# Patient Record
Sex: FEMALE | Race: WHITE | NOT HISPANIC OR LATINO | Employment: FULL TIME | URBAN - METROPOLITAN AREA
[De-identification: names, ages, dates, MRNs, and addresses within clinical notes are randomized per-mention and may not be internally consistent; named-entity substitution may affect disease eponyms.]

---

## 2017-02-08 ENCOUNTER — TELEPHONE (OUTPATIENT)
Dept: FAMILY MEDICINE | Facility: CLINIC | Age: 32
End: 2017-02-08

## 2017-02-08 NOTE — TELEPHONE ENCOUNTER
2/8/2017    Call Regarding Preventive Health Screening Cervical/PAP    Attempt 1    Message on voicemail     Comments:       Outreach   cnt

## 2017-03-23 ENCOUNTER — TELEPHONE (OUTPATIENT)
Dept: FAMILY MEDICINE | Facility: CLINIC | Age: 32
End: 2017-03-23

## 2017-03-23 NOTE — LETTER
[facility]  [address] [place]  [address]  Phone: [phone]      March 23, 2017      Lee Ann Eluthier  [address] [facility] [address] [address]              Dear Ms. Valdez,    In order to ensure we are providing the best quality care, Dr. Seaman and I have reviewed your chart and see that you are due for a yearly Physical including a Pap, fasting cholesterol lab test and to update Depression/Anxiety questionnaires.     Please call the clinic to set up an office visit at [phone] or [phone]. Please come fasting to the appointment, usually early morning appointment make this easier. Fasting = Nothing to eat or drink for 10-12 hours prior to test, but can have plenty of water.     We greatly appreciate the opportunity to serve you. Thank you for trusting us with your health care.     Sincerely,     DEIRDRE San M.D.

## 2017-03-23 NOTE — TELEPHONE ENCOUNTER
Panel Management Review      Patient has the following on her problem list:     Anxiety  review  PHQ-9 SCORE 5/5/2008 4/18/2016   Total Score 2 -   Total Score - 11     ROSY-7 SCORE 4/18/2016   Total Score 10      Patient is due for:  PHQ9 and ROSY      Composite cancer screening  Chart review shows that this patient is due/due soon for the following Pap Smear  Summary:    Patient is due/failing the following:   LDL, PAP and PHQ9    Action needed:   Patient needs office visit for PEPAP, fasting labs. and Patient needs to do PHQ9.    Type of outreach:    Sent [facility] message. and Sent letter.    Questions for provider review:    None                                                                                                                                    Daria Yates CMA

## 2017-04-05 NOTE — TELEPHONE ENCOUNTER
"Letter returned stating \"Moved Left No Address -  Unable to Forward - Return to Sender\".  ..Franca Claros    "

## 2017-05-17 NOTE — TELEPHONE ENCOUNTER
5/17/2017     Call Regarding Preventive Health Screening Cervical/PAP, LDL and Physical  Attempt 2     Message on voicemail   Comments:         Outreach   BENITA

## 2017-05-24 NOTE — TELEPHONE ENCOUNTER
5/24/2017    Call Regarding Preventive Health Screening Cervical/PAP and LDL    Attempt 3    Message on voicemail     Comments:       Outreach   CC

## 2017-05-27 ENCOUNTER — HEALTH MAINTENANCE LETTER (OUTPATIENT)
Age: 32
End: 2017-05-27

## 2020-11-03 ENCOUNTER — TRANSFERRED RECORDS (OUTPATIENT)
Dept: HEALTH INFORMATION MANAGEMENT | Facility: CLINIC | Age: 35
End: 2020-11-03

## 2020-11-03 LAB — PAP-ABSTRACT: NORMAL

## 2020-12-11 ENCOUNTER — TRANSFERRED RECORDS (OUTPATIENT)
Dept: HEALTH INFORMATION MANAGEMENT | Facility: CLINIC | Age: 35
End: 2020-12-11

## 2021-01-19 ENCOUNTER — TRANSFERRED RECORDS (OUTPATIENT)
Dept: HEALTH INFORMATION MANAGEMENT | Facility: CLINIC | Age: 36
End: 2021-01-19

## 2021-03-23 ENCOUNTER — OFFICE VISIT (OUTPATIENT)
Dept: FAMILY MEDICINE | Facility: CLINIC | Age: 36
End: 2021-03-23
Payer: COMMERCIAL

## 2021-03-23 VITALS
TEMPERATURE: 99.2 F | OXYGEN SATURATION: 100 % | HEIGHT: 65 IN | DIASTOLIC BLOOD PRESSURE: 47 MMHG | WEIGHT: 138 LBS | BODY MASS INDEX: 22.99 KG/M2 | SYSTOLIC BLOOD PRESSURE: 119 MMHG | HEART RATE: 96 BPM

## 2021-03-23 DIAGNOSIS — Z23 NEED FOR TETANUS BOOSTER: Primary | ICD-10-CM

## 2021-03-23 DIAGNOSIS — F41.1 GENERALIZED ANXIETY DISORDER: ICD-10-CM

## 2021-03-23 DIAGNOSIS — Z30.09 GENERAL COUNSELING FOR PRESCRIPTION OF ORAL CONTRACEPTIVES: ICD-10-CM

## 2021-03-23 PROCEDURE — 90471 IMMUNIZATION ADMIN: CPT | Performed by: FAMILY MEDICINE

## 2021-03-23 PROCEDURE — 96127 BRIEF EMOTIONAL/BEHAV ASSMT: CPT | Performed by: FAMILY MEDICINE

## 2021-03-23 PROCEDURE — 90714 TD VACC NO PRESV 7 YRS+ IM: CPT | Performed by: FAMILY MEDICINE

## 2021-03-23 PROCEDURE — 99204 OFFICE O/P NEW MOD 45 MIN: CPT | Mod: 25 | Performed by: FAMILY MEDICINE

## 2021-03-23 RX ORDER — DESOGESTREL AND ETHINYL ESTRADIOL 0.15-0.03
KIT ORAL
Qty: 108 TABLET | Refills: 3 | Status: SHIPPED | OUTPATIENT
Start: 2021-03-23 | End: 2022-03-07

## 2021-03-23 RX ORDER — FLUOXETINE 10 MG/1
10 CAPSULE ORAL DAILY
Qty: 14 CAPSULE | Refills: 0 | Status: SHIPPED | OUTPATIENT
Start: 2021-03-23 | End: 2021-05-13

## 2021-03-23 RX ORDER — HYDROXYZINE HYDROCHLORIDE 50 MG/1
50 TABLET, FILM COATED ORAL EVERY 6 HOURS PRN
Qty: 120 TABLET | Refills: 3 | Status: SHIPPED | OUTPATIENT
Start: 2021-03-23 | End: 2021-10-22

## 2021-03-23 RX ORDER — HYDROXYZINE HYDROCHLORIDE 50 MG/1
50 TABLET, FILM COATED ORAL PRN
COMMUNITY
End: 2021-05-13

## 2021-03-23 ASSESSMENT — MIFFLIN-ST. JEOR: SCORE: 1312.87
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
## 2021-03-24 ASSESSMENT — ANXIETY QUESTIONNAIRES
IF YOU CHECKED OFF ANY PROBLEMS ON THIS QUESTIONNAIRE, HOW DIFFICULT HAVE THESE PROBLEMS MADE IT FOR YOU TO DO YOUR WORK, TAKE CARE OF THINGS AT HOME, OR GET ALONG WITH OTHER PEOPLE: VERY DIFFICULT
GAD7 TOTAL SCORE: 18
1. FEELING NERVOUS, ANXIOUS, OR ON EDGE: NEARLY EVERY DAY
3. WORRYING TOO MUCH ABOUT DIFFERENT THINGS: NEARLY EVERY DAY
5. BEING SO RESTLESS THAT IT IS HARD TO SIT STILL: MORE THAN HALF THE DAYS
2. NOT BEING ABLE TO STOP OR CONTROL WORRYING: NEARLY EVERY DAY
7. FEELING AFRAID AS IF SOMETHING AWFUL MIGHT HAPPEN: NEARLY EVERY DAY
6. BECOMING EASILY ANNOYED OR IRRITABLE: NEARLY EVERY DAY

## 2021-03-24 ASSESSMENT — PATIENT HEALTH QUESTIONNAIRE - PHQ9
SUM OF ALL RESPONSES TO PHQ QUESTIONS 1-9: 13
5. POOR APPETITE OR OVEREATING: SEVERAL DAYS

## 2021-03-25 ASSESSMENT — ANXIETY QUESTIONNAIRES: GAD7 TOTAL SCORE: 18

## 2021-05-13 ENCOUNTER — OFFICE VISIT (OUTPATIENT)
Dept: FAMILY MEDICINE | Facility: CLINIC | Age: 36
End: 2021-05-13
Payer: COMMERCIAL

## 2021-05-13 VITALS
TEMPERATURE: 98.9 F | DIASTOLIC BLOOD PRESSURE: 86 MMHG | BODY MASS INDEX: 22.99 KG/M2 | HEART RATE: 90 BPM | SYSTOLIC BLOOD PRESSURE: 126 MMHG | OXYGEN SATURATION: 100 % | HEIGHT: 65 IN | WEIGHT: 138 LBS

## 2021-05-13 DIAGNOSIS — L98.9 SKIN LESION: ICD-10-CM

## 2021-05-13 DIAGNOSIS — F41.1 GENERALIZED ANXIETY DISORDER: ICD-10-CM

## 2021-05-13 DIAGNOSIS — Z12.4 SCREENING FOR CERVICAL CANCER: ICD-10-CM

## 2021-05-13 DIAGNOSIS — Z13.220 SCREENING FOR CHOLESTEROL LEVEL: ICD-10-CM

## 2021-05-13 DIAGNOSIS — Z00.00 ROUTINE GENERAL MEDICAL EXAMINATION AT A HEALTH CARE FACILITY: Primary | ICD-10-CM

## 2021-05-13 PROCEDURE — 87624 HPV HI-RISK TYP POOLED RSLT: CPT | Performed by: FAMILY MEDICINE

## 2021-05-13 PROCEDURE — 99395 PREV VISIT EST AGE 18-39: CPT | Performed by: FAMILY MEDICINE

## 2021-05-13 PROCEDURE — 99213 OFFICE O/P EST LOW 20 MIN: CPT | Mod: 25 | Performed by: FAMILY MEDICINE

## 2021-05-13 PROCEDURE — G0145 SCR C/V CYTO,THINLAYER,RESCR: HCPCS | Performed by: FAMILY MEDICINE

## 2021-05-13 PROCEDURE — 96127 BRIEF EMOTIONAL/BEHAV ASSMT: CPT | Performed by: FAMILY MEDICINE

## 2021-05-13 ASSESSMENT — ANXIETY QUESTIONNAIRES
5. BEING SO RESTLESS THAT IT IS HARD TO SIT STILL: NOT AT ALL
1. FEELING NERVOUS, ANXIOUS, OR ON EDGE: NOT AT ALL
3. WORRYING TOO MUCH ABOUT DIFFERENT THINGS: NOT AT ALL
2. NOT BEING ABLE TO STOP OR CONTROL WORRYING: NOT AT ALL
7. FEELING AFRAID AS IF SOMETHING AWFUL MIGHT HAPPEN: NOT AT ALL
GAD7 TOTAL SCORE: 0
6. BECOMING EASILY ANNOYED OR IRRITABLE: NOT AT ALL

## 2021-05-13 ASSESSMENT — PATIENT HEALTH QUESTIONNAIRE - PHQ9
5. POOR APPETITE OR OVEREATING: NOT AT ALL
SUM OF ALL RESPONSES TO PHQ QUESTIONS 1-9: 2

## 2021-05-13 ASSESSMENT — MIFFLIN-ST. JEOR: SCORE: 1308.9

## 2021-05-13 NOTE — PROGRESS NOTES
128/86    SUBJECTIVE:   CC: Lee Ann Valdez is an 35 year old woman who presents for preventive health visit.     Patient has been advised of split billing requirements and indicates understanding: Yes     Healthy Habits:    Do you get at least three servings of calcium containing foods daily (dairy, green leafy vegetables, etc.)? yes    Amount of exercise or daily activities, outside of work: 4-5 day(s) per week    Problems taking medications regularly No    Medication side effects: No    Have you had an eye exam in the past two years? yes    Do you see a dentist twice per year? yes    Do you have sleep apnea, excessive snoring or daytime drowsiness?no      Anxiety Follow-Up    How are you doing with your anxiety since your last visit? Improved     Are you having other symptoms that might be associated with anxiety? No    Have you had a significant life event? No     Are you feeling depressed? Yes:    but better     Do you have any concerns with your use of alcohol or other drugs? No    Social History     Tobacco Use     Smoking status: Never Smoker     Smokeless tobacco: Never Used   Substance Use Topics     Alcohol use: Yes     Comment: Couple per week.      Drug use: No     ROSY-7 SCORE 4/18/2016 3/24/2021   Total Score 10 18     PHQ 4/18/2016 3/24/2021 5/13/2021   PHQ-9 Total Score 11 13 2   Q9: Thoughts of better off dead/self-harm past 2 weeks Not at all Not at all Not at all     Last PHQ-9 5/13/2021   1.  Little interest or pleasure in doing things 0   2.  Feeling down, depressed, or hopeless 0   3.  Trouble falling or staying asleep, or sleeping too much 1   4.  Feeling tired or having little energy 1   5.  Poor appetite or overeating 0   6.  Feeling bad about yourself 0   7.  Trouble concentrating 0   8.  Moving slowly or restless 0   Q9: Thoughts of better off dead/self-harm past 2 weeks 0   PHQ-9 Total Score 2   Difficulty at work, home, or with people Not difficult at all     ROSY-7  5/13/2021   1.  Feeling nervous, anxious, or on edge 0   2. Not being able to stop or control worrying 0   3. Worrying too much about different things 0   4. Trouble relaxing 0   5. Being so restless that it is hard to sit still 0   6. Becoming easily annoyed or irritable 0   7. Feeling afraid, as if something awful might happen 0   ROSY-7 Total Score 0   If you checked any problems, how difficult have they made it for you to do your work, take care of things at home, or get along with other people? -         Abuse: Current or Past(Physical, Sexual or Emotional)- No  Do you feel safe in your environment? Yes    Have you ever done Advance Care Planning? (For example, a Health Directive, POLST, or a discussion with a medical provider or your loved ones about your wishes): No, advance care planning information given to patient to review.  Patient declined advance care planning discussion at this time.    Social History     Tobacco Use     Smoking status: Never Smoker     Smokeless tobacco: Never Used   Substance Use Topics     Alcohol use: Yes     Comment: Couple per week.      If you drink alcohol do you typically have >3 drinks per day or >7 drinks per week? No                     Reviewed orders with patient.  Reviewed health maintenance and updated orders accordingly - Yes  BP Readings from Last 3 Encounters:   05/13/21 126/86   03/23/21 119/47   04/18/16 120/66    Wt Readings from Last 3 Encounters:   05/13/21 62.6 kg (138 lb)   03/23/21 62.6 kg (138 lb)   04/18/16 57.2 kg (126 lb)                  Patient Active Problem List   Diagnosis     CARDIOVASCULAR SCREENING; LDL GOAL LESS THAN 160     Iron deficiency anemia     Vitamin D deficiency     [facility]     Past Surgical History:   Procedure Laterality Date     NO HISTORY OF SURGERY         Social History     Tobacco Use     Smoking status: Never Smoker     Smokeless tobacco: Never Used   Substance Use Topics     Alcohol use: Yes     Comment: Couple per week.       "Family History   Problem Relation Age of Onset     Depression Maternal Uncle      Diabetes Maternal Uncle      Asthma No family hx of      C.A.D. No family hx of      Cerebrovascular Disease No family hx of      Breast Cancer No family hx of      Cancer - colorectal No family hx of      Prostate Cancer No family hx of      Hypertension Mother            FSH-7: No flowsheet data found.        Pertinent mammograms are reviewed under the imaging tab.  History of abnormal Pap smear: NO - age 30-65 PAP every 5 years with negative HPV co-testing recommended  PAP / HPV 12/17/2012 5/20/2011 5/5/2008   PAP NIL NIL NIL     Reviewed and updated as needed this visit by clinical staff  Tobacco  Allergies  Meds   Med Hx  Surg Hx  Fam Hx  Soc Hx        Reviewed and updated as needed this visit by Provider                Past Medical History:   Diagnosis Date     NO ACTIVE PROBLEMS         ROS:  CONSTITUTIONAL: NEGATIVE for fever, chills, change in weight  INTEGUMENTARU/SKIN: NEGATIVE for worrisome rashes, moles or lesions  EYES: NEGATIVE for vision changes or irritation  ENT: NEGATIVE for ear, mouth and throat problems  RESP: NEGATIVE for significant cough or SOB  BREAST: NEGATIVE for masses, tenderness or discharge  CV: NEGATIVE for chest pain, palpitations or peripheral edema  GI: NEGATIVE for nausea, abdominal pain, heartburn, or change in bowel habits  : NEGATIVE for unusual urinary or vaginal symptoms. Periods are regular.  MUSCULOSKELETAL: NEGATIVE for significant arthralgias or myalgia  NEURO: NEGATIVE for weakness, dizziness or paresthesias  PSYCHIATRIC: NEGATIVE for changes in mood or affect    OBJECTIVE:   /86 (BP Location: Right arm, Patient Position: Sitting, Cuff Size: Adult Regular)   Pulse 90   Temp 98.9  F (37.2  C) (Tympanic)   Ht 1.638 m (5' 4.5\")   Wt 62.6 kg (138 lb)   SpO2 100%   BMI 23.32 kg/m    EXAM:  GENERAL: healthy, alert and no distress  EYES: Eyes grossly normal to inspection, PERRL " and conjunctivae and sclerae normal  HENT: ear canals and TM's normal, nose and mouth without ulcers or lesions  NECK: no adenopathy, no asymmetry, masses, or scars and thyroid normal to palpation  RESP: lungs clear to auscultation - no rales, rhonchi or wheezes  BREAST: normal without masses, tenderness or nipple discharge and no palpable axillary masses or adenopathy  CV: regular rate and rhythm, normal S1 S2, no S3 or S4, no murmur, click or rub, no peripheral edema and peripheral pulses strong  ABDOMEN: soft, nontender, no hepatosplenomegaly, no masses and bowel sounds normal   (female): normal female external genitalia, normal urethral meatus, vaginal mucosa pink, moist, well rugated, and normal cervix/adnexa/uterus without masses or discharge  MS: no gross musculoskeletal defects noted, no edema  SKIN: raised lesion 1 cm left inner thigh,elongated mole dark left lower abdomen    NEURO: Normal strength and tone, mentation intact and speech normal  PSYCH: mentation appears normal, affect normal/bright    Diagnostic Test Results:  Labs reviewed in Epic    ASSESSMENT/PLAN:   1. Routine general medical examination at a health care facility      2. Screening for cervical cancer  History of  leep   - HPV High Risk Types DNA Cervical  - Pap imaged thin layer screen with HPV - recommended age 30 - 65    3. Screening for cholesterol level    - Lipid panel reflex to direct LDL Fasting not fasting     4. Generalized anxiety disorder  Much improved   - FLUoxetine (PROZAC) 20 MG capsule; Take 1 capsule (20 mg) by mouth daily  Dispense: 90 capsule; Refill: 1    5. Skin lesion  See derm   - DERMATOLOGY ADULT REFERRAL; Future    Patient has been advised of split billing requirements and indicates understanding: Yes  COUNSELING:   Reviewed preventive health counseling, as reflected in patient instructions    Estimated body mass index is 23.32 kg/m  as calculated from the following:    Height as of this encounter: 1.638 m (5'  "4.5\").    Weight as of this encounter: 62.6 kg (138 lb).        She reports that she has never smoked. She has never used smokeless tobacco.      Counseling Resources:  ATP IV Guidelines  Pooled Cohorts Equation Calculator  Breast Cancer Risk Calculator  BRCA-Related Cancer Risk Assessment: FHS-7 Tool  FRAX Risk Assessment  ICSI Preventive Guidelines  Dietary Guidelines for Americans, 2010  USDA's MyPlate  ASA Prophylaxis  Lung CA Screening    Delfina Seaman MD  [facility]  "

## 2021-05-14 ASSESSMENT — ANXIETY QUESTIONNAIRES: GAD7 TOTAL SCORE: 0

## 2021-05-17 LAB
COPATH REPORT: NORMAL
PAP: NORMAL

## 2021-05-19 LAB
FINAL DIAGNOSIS: NORMAL
HPV HR 12 DNA CVX QL NAA+PROBE: NEGATIVE
HPV16 DNA SPEC QL NAA+PROBE: NEGATIVE
HPV18 DNA SPEC QL NAA+PROBE: NEGATIVE
SPECIMEN DESCRIPTION: NORMAL
SPECIMEN SOURCE CVX/VAG CYTO: NORMAL

## 2021-06-16 ENCOUNTER — OFFICE VISIT (OUTPATIENT)
Dept: DERMATOLOGY | Facility: CLINIC | Age: 36
End: 2021-06-16
Attending: FAMILY MEDICINE
Payer: COMMERCIAL

## 2021-06-16 VITALS — DIASTOLIC BLOOD PRESSURE: 60 MMHG | HEART RATE: 80 BPM | SYSTOLIC BLOOD PRESSURE: 116 MMHG | OXYGEN SATURATION: 100 %

## 2021-06-16 DIAGNOSIS — D48.5 NEOPLASM OF UNCERTAIN BEHAVIOR OF SKIN: Primary | ICD-10-CM

## 2021-06-16 DIAGNOSIS — D23.9 DERMATOFIBROMA: ICD-10-CM

## 2021-06-16 PROCEDURE — 11102 TANGNTL BX SKIN SINGLE LES: CPT | Performed by: PHYSICIAN ASSISTANT

## 2021-06-16 PROCEDURE — 88305 TISSUE EXAM BY PATHOLOGIST: CPT | Performed by: DERMATOLOGY

## 2021-06-16 PROCEDURE — 99203 OFFICE O/P NEW LOW 30 MIN: CPT | Mod: 25 | Performed by: PHYSICIAN ASSISTANT

## 2021-06-16 NOTE — LETTER
6/16/2021         RE: Lee Ann Valdez  [address] [facility] [address]        Dear Colleague,    Thank you for referring your patient, Lee Ann Valdez, to the [facility]. Please see a copy of my visit note below.    Lee Ann Valdez is an extremely pleasant 35 year old year old female patient here today for mole on left abdomen and bump on thigh. She notes mole has been present for years, becoming more raised. Rubbing on clothing. She also notes bump on left thigh, present for years, gets irritated shaving.  Patient has no other skin complaints today.  Remainder of the HPI, Meds, PMH, Allergies, FH, and SH was reviewed in chart.    Past Medical History:   Diagnosis Date     NO ACTIVE PROBLEMS        Past Surgical History:   Procedure Laterality Date     NO HISTORY OF SURGERY          Family History   Problem Relation Age of Onset     Hypertension Mother      Depression Maternal Uncle      Diabetes Maternal Uncle      Asthma No family hx of      C.A.D. No family hx of      Cerebrovascular Disease No family hx of      Breast Cancer No family hx of      Cancer - colorectal No family hx of      Prostate Cancer No family hx of        Social History     Socioeconomic History     Marital status: Single     Spouse name: Augie Valdez     Number of children: 0     Years of education: 16+     Highest education level: Not on file   Occupational History     Occupation: IT     Employer: [employer]   Social Needs     Financial resource strain: Not on file     Food insecurity     Worry: Not on file     Inability: Not on file     Transportation needs     Medical: Not on file     Non-medical: Not on file   Tobacco Use     Smoking status: Never Smoker     Smokeless tobacco: Never Used   Substance and Sexual Activity     Alcohol use: Yes     Comment: Couple per week.      Drug use: No     Sexual activity: Yes     Partners: Male     Birth control/protection: Condom, Pill   Lifestyle      Physical activity     Days per week: Not on file     Minutes per session: Not on file     Stress: Not on file   Relationships     Social connections     Talks on phone: Not on file     Gets together: Not on file     Attends Religion service: Not on file     Active member of club or organization: Not on file     Attends meetings of clubs or organizations: Not on file     Relationship status: Not on file     Intimate partner violence     Fear of current or ex partner: Not on file     Emotionally abused: Not on file     Physically abused: Not on file     Forced sexual activity: Not on file   Other Topics Concern      Service No     Blood Transfusions No     Caffeine Concern No     Occupational Exposure No     Hobby Hazards No     Sleep Concern No     Stress Concern No     Weight Concern No     Special Diet No     Back Care No     Exercise No     Bike Helmet Yes     Seat Belt Yes     Self-Exams Yes     Parent/sibling w/ CABG, MI or angioplasty before 65F 55M? No   Social History Narrative    Lives with 2 roommates       Outpatient Encounter Medications as of 6/16/2021   Medication Sig Dispense Refill     desogestrel-ethinyl estradiol (APRI) 0.15-30 MG-MCG tablet Take 1 active pill daily for 12 weeks skip the placebo pills except for the 13th week. 108 tablet 3     FLUoxetine (PROZAC) 20 MG capsule Take 1 capsule (20 mg) by mouth daily 90 capsule 1     hydrOXYzine (ATARAX) 50 MG tablet Take 1 tablet (50 mg) by mouth every 6 hours as needed for anxiety 120 tablet 3     Pediatric Multivit-Minerals-C (FLINTSTONES COMPLETE PO) Take  by mouth. 1 tablet daily         No facility-administered encounter medications on file as of 6/16/2021.              O:   NAD, WDWN, Alert & Oriented, Mood & Affect wnl, Vitals stable   Here today alone   /60   Pulse 80   SpO2 100%    General appearance normal   Vitals stable   Alert, oriented and in no acute distress     Pink firm papule on left thigh   0.5 cm light brown  papule on left lateral abdomen       Eyes: Conjunctivae/lids:Normal     ENT: Lips: normal    MSK:Normal    Pulm: Breathing Normal    Neuro/Psych: Orientation:Alert and Orientedx3 ; Mood/Affect:normal     A/P:  1. Dermatofibroma on left thigh   Discussed excision since bothersome, she will schedule with Dr. Sharma for excision.     2. R/O irritated nevus on left lateral abdomen  TANGENTIAL BIOPSY SENT OUT:  After consent, anesthesia with LEC and prep, tangential excision performed and specimen sent out for permanent section histology.  No complications and routine wound care. Patient told to call our office in 1-2 weeks for result.              Again, thank you for allowing me to participate in the care of your patient.        Sincerely,        vYette Augustine PA-C

## 2021-06-16 NOTE — PROGRESS NOTES
Lee Ann Valdez is an extremely pleasant 35 year old year old female patient here today for mole on left abdomen and bump on thigh. She notes mole has been present for years, becoming more raised. Rubbing on clothing. She also notes bump on left thigh, present for years, gets irritated shaving.  Patient has no other skin complaints today.  Remainder of the HPI, Meds, PMH, Allergies, FH, and SH was reviewed in chart.    Past Medical History:   Diagnosis Date     NO ACTIVE PROBLEMS        Past Surgical History:   Procedure Laterality Date     NO HISTORY OF SURGERY          Family History   Problem Relation Age of Onset     Hypertension Mother      Depression Maternal Uncle      Diabetes Maternal Uncle      Asthma No family hx of      C.A.D. No family hx of      Cerebrovascular Disease No family hx of      Breast Cancer No family hx of      Cancer - colorectal No family hx of      Prostate Cancer No family hx of        Social History     Socioeconomic History     Marital status: Single     Spouse name: Augie Valdez     Number of children: 0     Years of education: 16+     Highest education level: Not on file   Occupational History     Occupation: IT     Employer: [employer]   Social Needs     Financial resource strain: Not on file     Food insecurity     Worry: Not on file     Inability: Not on file     Transportation needs     Medical: Not on file     Non-medical: Not on file   Tobacco Use     Smoking status: Never Smoker     Smokeless tobacco: Never Used   Substance and Sexual Activity     Alcohol use: Yes     Comment: Couple per week.      Drug use: No     Sexual activity: Yes     Partners: Male     Birth control/protection: Condom, Pill   Lifestyle     Physical activity     Days per week: Not on file     Minutes per session: Not on file     Stress: Not on file   Relationships     Social connections     Talks on phone: Not on file     Gets together: Not on file     Attends Zoroastrian service: Not on  file     Active member of club or organization: Not on file     Attends meetings of clubs or organizations: Not on file     Relationship status: Not on file     Intimate partner violence     Fear of current or ex partner: Not on file     Emotionally abused: Not on file     Physically abused: Not on file     Forced sexual activity: Not on file   Other Topics Concern      Service No     Blood Transfusions No     Caffeine Concern No     Occupational Exposure No     Hobby Hazards No     Sleep Concern No     Stress Concern No     Weight Concern No     Special Diet No     Back Care No     Exercise No     Bike Helmet Yes     Seat Belt Yes     Self-Exams Yes     Parent/sibling w/ CABG, MI or angioplasty before 65F 55M? No   Social History Narrative    Lives with 2 roommates       Outpatient Encounter Medications as of 6/16/2021   Medication Sig Dispense Refill     desogestrel-ethinyl estradiol (APRI) 0.15-30 MG-MCG tablet Take 1 active pill daily for 12 weeks skip the placebo pills except for the 13th week. 108 tablet 3     FLUoxetine (PROZAC) 20 MG capsule Take 1 capsule (20 mg) by mouth daily 90 capsule 1     hydrOXYzine (ATARAX) 50 MG tablet Take 1 tablet (50 mg) by mouth every 6 hours as needed for anxiety 120 tablet 3     Pediatric Multivit-Minerals-C (FLINTSTONES COMPLETE PO) Take  by mouth. 1 tablet daily         No facility-administered encounter medications on file as of 6/16/2021.              O:   NAD, WDWN, Alert & Oriented, Mood & Affect wnl, Vitals stable   Here today alone   /60   Pulse 80   SpO2 100%    General appearance normal   Vitals stable   Alert, oriented and in no acute distress     Pink firm papule on left thigh   0.5 cm light brown papule on left lateral abdomen       Eyes: Conjunctivae/lids:Normal     ENT: Lips: normal    MSK:Normal    Pulm: Breathing Normal    Neuro/Psych: Orientation:Alert and Orientedx3 ; Mood/Affect:normal     A/P:  1. Dermatofibroma on left thigh   Discussed  excision since bothersome, she will schedule with Dr. Sharma for excision.     2. R/O irritated nevus on left lateral abdomen  TANGENTIAL BIOPSY SENT OUT:  After consent, anesthesia with LEC and prep, tangential excision performed and specimen sent out for permanent section histology.  No complications and routine wound care. Patient told to call our office in 1-2 weeks for result.

## 2021-06-16 NOTE — PATIENT INSTRUCTIONS
Wound Care Instructions     FOR SUPERFICIAL WOUNDS     [facility] [phone]    [facility] [phone]                       AFTER 24 HOURS YOU SHOULD REMOVE THE BANDAGE AND BEGIN DAILY DRESSING CHANGES AS FOLLOWS:     1) Remove Dressing.     2) Clean and dry the area with tap water using a Q-tip or sterile gauze pad.     3) Apply Vaseline, Aquaphor, Polysporin ointment or Bacitracin ointment over entire wound.  Do NOT use Neosporin ointment.     4) Cover the wound with a band-aid, or a sterile non-stick gauze pad and micropore paper tape      REPEAT THESE INSTRUCTIONS AT LEAST ONCE A DAY UNTIL THE WOUND HAS COMPLETELY HEALED.    It is an old wives tale that a wound heals better when it is exposed to air and allowed to dry out. The wound will heal faster with a better cosmetic result if it is kept moist with ointment and covered with a bandage.    **Do not let the wound dry out.**      Supplies Needed:      *Cotton tipped applicators (Q-tips)    *Polysporin Ointment or Bacitracin Ointment (NOT NEOSPORIN)    *Band-aids or non-stick gauze pads and micropore paper tape.      PATIENT INFORMATION:    During the healing process you will notice a number of changes. All wounds develop a small halo of redness surrounding the wound.  This means healing is occurring. Severe itching with extensive redness usually indicates sensitivity to the ointment or bandage tape used to dress the wound.  You should call our office if this develops.      Swelling  and/or discoloration around your surgical site is common, particularly when performed around the eye.    All wounds normally drain.  The larger the wound the more drainage there will be.  After 7-10 days, you will notice the wound beginning to shrink and new skin will begin to grow.  The wound is healed when you can see skin has formed over the entire area.  A healed wound has a healthy, shiny look to the surface and is red to dark pink in color  to normalize.  Wounds may take approximately 4-6 weeks to heal.  Larger wounds may take 6-8 weeks.  After the wound is healed you may discontinue dressing changes.    You may experience a sensation of tightness as your wound heals. This is normal and will gradually subside.    Your healed wound may be sensitive to temperature changes. This sensitivity improves with time, but if you re having a lot of discomfort, try to avoid temperature extremes.    Patients frequently experience itching after their wound appears to have healed because of the continue healing under the skin.  Plain Vaseline will help relieve the itching.        POSSIBLE COMPLICATIONS    BLEEDIN. Leave the bandage in place.  2. Use tightly rolled up gauze or a cloth to apply direct pressure over the bandage for 30  minutes.  3. Reapply pressure for an additional 30 minutes if necessary  4. Use additional gauze and tape to maintain pressure once the bleeding has stopped.

## 2021-06-18 LAB — COPATH REPORT: NORMAL

## 2021-09-13 ENCOUNTER — MYC MEDICAL ADVICE (OUTPATIENT)
Dept: FAMILY MEDICINE | Facility: CLINIC | Age: 36
End: 2021-09-13

## 2021-10-07 ENCOUNTER — TELEPHONE (OUTPATIENT)
Dept: DERMATOLOGY | Facility: CLINIC | Age: 36
End: 2021-10-07

## 2021-10-07 NOTE — TELEPHONE ENCOUNTER
[facility] Call Center    Phone Message    May a detailed message be left on voicemail: yes     Reason for Call: Other: Pt had a Dermatoprome excision procedure scheduled with Dr. Sharma on 10/13 in [facility], Pt has recently moved to [place] and is wanting this procedure done at the [facility] instead. Please call Pt at [phone] to further discuss scheduling options. Thank you.     Action Taken: Message routed to:  [facility] ([facility]): Derm Surg    Travel Screening: Not Applicable

## 2021-10-22 DIAGNOSIS — F41.1 GENERALIZED ANXIETY DISORDER: ICD-10-CM

## 2021-10-22 RX ORDER — HYDROXYZINE HYDROCHLORIDE 50 MG/1
TABLET, FILM COATED ORAL
Qty: 120 TABLET | Refills: 1 | Status: SHIPPED | OUTPATIENT
Start: 2021-10-22 | End: 2023-04-25

## 2021-10-25 ENCOUNTER — TRANSFERRED RECORDS (OUTPATIENT)
Dept: HEALTH INFORMATION MANAGEMENT | Facility: CLINIC | Age: 36
End: 2021-10-25
Payer: COMMERCIAL

## 2021-11-15 ENCOUNTER — TRANSFERRED RECORDS (OUTPATIENT)
Dept: HEALTH INFORMATION MANAGEMENT | Facility: CLINIC | Age: 36
End: 2021-11-15
Payer: COMMERCIAL

## 2021-11-23 ENCOUNTER — TRANSFERRED RECORDS (OUTPATIENT)
Dept: HEALTH INFORMATION MANAGEMENT | Facility: CLINIC | Age: 36
End: 2021-11-23
Payer: COMMERCIAL

## 2021-12-17 DIAGNOSIS — F41.1 GENERALIZED ANXIETY DISORDER: ICD-10-CM

## 2022-03-05 DIAGNOSIS — Z30.09 GENERAL COUNSELING FOR PRESCRIPTION OF ORAL CONTRACEPTIVES: ICD-10-CM

## 2022-03-07 RX ORDER — DESOGESTREL AND ETHINYL ESTRADIOL 0.15-0.03
KIT ORAL
Qty: 28 TABLET | Refills: 2 | Status: SHIPPED | OUTPATIENT
Start: 2022-03-07 | End: 2022-05-06

## 2022-03-07 NOTE — TELEPHONE ENCOUNTER
Prescription approved per [facility] Refill Protocol.  Michelle Martinez RN

## 2022-05-06 DIAGNOSIS — Z30.09 GENERAL COUNSELING FOR PRESCRIPTION OF ORAL CONTRACEPTIVES: ICD-10-CM

## 2022-05-06 RX ORDER — DESOGESTREL AND ETHINYL ESTRADIOL 0.15-0.03
KIT ORAL
Qty: 28 TABLET | Refills: 0 | Status: SHIPPED | OUTPATIENT
Start: 2022-05-06 | End: 2022-05-19

## 2022-05-06 NOTE — TELEPHONE ENCOUNTER
October 12, 2020       Libertad Cedillo MD  [address]  [address] [address]  Via Fax: [phone]      Patient: Hilary Washington   YOB: 1956   Date of Visit: 10/12/2020       Dear Dr. Cedillo:    Thank you for referring Hilary Washington to me for evaluation. Below are my notes for this visit with her.    If you have questions, please do not hesitate to call me. I look forward to following your patient along with you.      Sincerely,        Karina Cordoba DO        CC: No Recipients  Karina Cordoba DO  10/12/2020  3:14 PM  Signed  Cardiac Follow-Up    Referring  Physician   Libertad Cedillo MD  [address] [address] [phone]     HPI   The patient is a 64 year old female who presented for follow-up appointment     is pleasant 64-year-old female with past medical history of hypertension, hyperlipidemia, breast cancer status post lumpectomy and radiation in 2015, tongue cancer status post resection in 1990, chronic kidney disease with creatinine around 1.7 who presented for follow-up.      Patient was in her usual state of health until August 10. She wears fitbit and noticed elevation with heart rate at rest and with minimal physical activity.   During the evaluation at her primary care physician office EKG was consistent with new atrial fibrillation. She was started on Eliquis 5 mg p.o. twice daily and referred for cardiology consultation.      Patient walks for 30 minutes twice a week. She denies significant caffeine or alcohol intake.   She did not require any chemotherapy for treatment of her tongue cancer and right breast cancer. She sees Dr. Johnson on the regular basis.      She was not able to tolerate rate-controlled a-fib and was started on Flecainide 50 mg po bid. She noticed significant improvement in symptoms after initiation of antiarrhythmic medication.      Today, she denies any pressure-type chest pain, dizziness, weakness, syncopal episodes.   She  Prescription approved per [facility] Refill Protocol.  Scheduled 5/19/22.  Michelle Martinez RN      feels very well in general.   No new complaints  LDL - 108 on atorvastatin 10 mg po daily  LE edema - ON AND OFF  + Arthralgia  She has intermittent \" noise in the ear \"    Past Medical History:   Past Medical History:   Diagnosis Date   • Chronic kidney disease    • Essential hypertension, benign    • GERD (gastroesophageal reflux disease)    • History of atrial fibrillation    • History of tongue cancer    • Hyperlipidemia    • Malignant neoplasm of breast (CMS/HCC)      Past Surgical History:   Past Surgical History:   Procedure Laterality Date   • Breast lumpectomy     • Cholecystectomy     • Tonsillectomy       Family History:   Family History   Problem Relation Age of Onset   • Diabetes Mother    • Atrial Fibrilliation Brother      Social History:   Social History     Tobacco Use   • Smoking status: Never Smoker   • Smokeless tobacco: Never Used   Substance Use Topics   • Alcohol use: Yes     Comment: Social    • Drug use: Not on file     Allergies:   ALLERGIES:   Allergen Reactions   • Ciprofloxacin Other (See Comments)     Unknown   • Levaquin Other (See Comments)     Unknown   • Sulfa Antibiotics Other (See Comments)     Unknown     Medications Including OTC:  Current Outpatient Medications   Medication Sig Dispense Refill   • metoPROLOL succinate (TOPROL-XL) 50 MG 24 hr tablet Take 1 tablet by mouth daily. 90 tablet 3   • atorvastatin (LIPITOR) 20 MG tablet Take 1 tablet by mouth daily. 90 tablet 1   • flecainide (TAMBOCOR) 50 MG tablet TAKE 1 TABLET TWICE A  tablet 3   • ELIQUIS 5 MG Tab TAKE 1 TABLET TWICE A  tablet 3   • famotidine (PEPCID) 20 MG tablet TAKE 1 TABLET BY MOUTH EVERY 12 HOURS 60 tablet 0   • EXEMESTANE PO Take 25 mg by mouth.     • cholecalciferol (VITAMIN D3) 1000 UNITS tablet Take 1,000 Units by mouth 2 times daily.     • amLODIPine (NORVASC) 10 MG tablet TAKE 1 TABLET BY MOUTH  DAILY       No current facility-administered medications for this visit.        ROS  Review of  Systems   Constitutional: Negative.    HENT: Negative.    Eyes: Negative.    Respiratory: Negative.    Cardiovascular: Negative for chest pain and leg swelling.   Gastrointestinal: Negative.    Endocrine: Negative.    Genitourinary: Negative.    Musculoskeletal: Negative.    Skin: Negative.    Allergic/Immunologic: Negative.    Neurological: Negative.    Hematological: Negative.    Psychiatric/Behavioral: Negative.    All other systems reviewed and are negative.      Physical Examination  Visit Vitals  /70 (BP Location: LUE - Left upper extremity, Patient Position: Standing, Cuff Size: Regular)   Pulse 72   Temp 97.6 °F (36.4 °C)   Resp 18   Ht 5' 6\" (1.676 m)   Wt 101.2 kg (223 lb)   SpO2 97%   BMI 35.99 kg/m²       Physical Exam   Constitutional: She is oriented to person, place, and time. She appears well-nourished. She is cooperative.   HENT:   Head: Normocephalic and atraumatic.   Mouth/Throat: Oropharynx is clear and moist and mucous membranes are normal.   Eyes: Pupils are equal, round, and reactive to light.   Neck: Trachea normal and normal range of motion. Neck supple. No JVD present. No muscular tenderness present. Carotid bruit is not present. No edema and no erythema present.   Cardiovascular: Regular rhythm, S1 normal, S2 normal and intact distal pulses. Bradycardia present. PMI is not displaced. Exam reveals no S3, no S4, no friction rub and no decreased pulses.   No murmur heard.  Trace b/l pitting edema - non-pitting   Pulmonary/Chest: Breath sounds normal. No respiratory distress. She has no decreased breath sounds. She has no wheezes.   Abdominal: Soft. Normal aorta. She exhibits no distension and no abdominal bruit. There is no abdominal tenderness. There is no CVA tenderness.   Musculoskeletal: Normal range of motion.   Neurological: She is alert and oriented to person, place, and time. She has normal strength. No sensory deficit.   Skin: Skin is warm and intact. No cyanosis. No pallor.  Nails show no clubbing.   Psychiatric: She has a normal mood and affect. Her speech is normal and behavior is normal.       All previous records reviewed    Laboratory Data  Prior Original Records on 11/11/2019   Component Date Value Ref Range Status   • Sodium 11/11/2019 142  135 - 145 mmol/L Final   • Creatinine Clearance (Est) 11/11/2019 58.0800  mL/min Final   • BUN/Creatinine Ratio 11/11/2019 28* 7 - 25 Final   • GFR Estimate,  11/11/2019 43  mL/min Final   • GFR Estimate, Non  11/11/2019 37  mL/min Final   • A/G Ratio, Serum 11/11/2019 1.0  1.0 - 2.4 Final   • FASTING STATUS 11/11/2019 2  hrs Final   • Potassium 11/11/2019 5.4* 3.4 - 5.1 mmol/L Final   • Chloride 11/11/2019 113* 98 - 107 mmol/L Final   • Carbon Dioxide 11/11/2019 21  21 - 32 mmol/L Final   • Anion Gap 11/11/2019 13  10 - 20 mmol/L Final   • Glucose 11/11/2019 93  65 - 99 mg/dL Final   • BUN 11/11/2019 41* 6 - 20 mg/dL Final   • Creatinine 11/11/2019 1.48* 0.51 - 0.95 mg/dL Final   • CALCIUM 11/11/2019 9.2  8.4 - 10.2 mg/dL Final   • TOTAL BILIRUBIN 11/11/2019 0.5  0.2 - 1.0 mg/dL Final   • AST/SGOT 11/11/2019 29  <38 Units/L Final   • ALT/SGPT 11/11/2019 26  <64 Units/L Final   • ALK PHOSPHATASE 11/11/2019 93  45 - 117 Units/L Final   • TOTAL PROTEIN 11/11/2019 6.9  6.4 - 8.2 g/dL Final   • Albumin 11/11/2019 3.5* 3.6 - 5.1 g/dL Final   • GLOBULIN 11/11/2019 3.4  2.0 - 4.0 g/dL Final   Prior Original Records on 11/11/2019   Component Date Value Ref Range Status   • CA27.29 11/11/2019 17.4  0.0 - 40.0 Units/mL Final   Prior Original Records on 11/11/2019   Component Date Value Ref Range Status   • WBC 11/11/2019 9.0  4.2 - 11.0 K/mcL Final   • RBC 11/11/2019 4.17  4.00 - 5.20 mil/mcL Final   • HGB 11/11/2019 12.9  12.0 - 15.5 g/dL Final   • HCT 11/11/2019 40.5  36.0 - 46.5 % Final   • MCV 11/11/2019 97.1  78.0 - 100.0 fl Final   • MCH 11/11/2019 30.9  26.0 - 34.0 pg Final   • MCHC 11/11/2019 31.9* 32.0 - 36.5  g/dL Final   • RDW-CV 11/11/2019 13.6  11.0 - 15.0 % Final   • PLT 11/11/2019 360  140 - 450 K/mcL Final   • NUCLEATED RBC'S 11/11/2019 0  0 - 0 /100 WBC Final   • DIFF TYPE 11/11/2019 AUTOMATED DIFFERENTIAL   Final   • Neutrophil 11/11/2019 70  % Final   • Lymph 11/11/2019 17  % Final   • MONO 11/11/2019 9  % Final   • EOSIN 11/11/2019 3  0.0000 - 6.0000 % Final   • BASO 11/11/2019 1  0.0000 - 2.0000 % Final   • Percent Immature Granuloctyes 11/11/2019 0  % Final   • Absolute Neutrophil 11/11/2019 6.3  1.8 - 7.7 K/mcL Final   • Absolute Lymph 11/11/2019 1.6  1.0 - 4.0 K/mcL Final   • Absolute Mono 11/11/2019 0.8  0.3 - 0.9 K/mcL Final   • Absolute Eos 11/11/2019 0.3  0.1 - 0.5 K/mcL Final   • Absolute Baso 11/11/2019 0.1  0.0 - 0.3 K/mcL Final   • Absolute Immature Granulocytes 11/11/2019 0.0  0 - 0.2 K/mcl Final   Prior Original Records on 11/11/2019   Component Date Value Ref Range Status   • CEA 11/11/2019 1.1  0.0 - 5.0 ng/mL Final   Prior Original Records on 05/06/2019   Component Date Value Ref Range Status   • WBC 05/06/2019 7.0  4.2 - 11.0 K/mcL Final   • RBC 05/06/2019 4.12  4.00 - 5.20 mil/mcL Final   • HGB 05/06/2019 13.0  12.0 - 15.5 g/dL Final   • HCT 05/06/2019 40.0  36.0 - 46.5 % Final   • MCV 05/06/2019 97.1  78.0 - 100.0 fl Final   • MCH 05/06/2019 31.6  26.0 - 34.0 pg Final   • MCHC 05/06/2019 32.5  32.0 - 36.5 g/dL Final   • RDW-CV 05/06/2019 13.4  11.0 - 15.0 % Final   • PLT 05/06/2019 293  140 - 450 K/mcL Final   • NUCLEATED RBC'S 05/06/2019 0  0 - 0 /100 WBC Final   • DIFF TYPE 05/06/2019 AUTOMATED DIFFERENTIAL   Final   • Neutrophil 05/06/2019 64  % Final   • Lymph 05/06/2019 20  % Final   • MONO 05/06/2019 10  % Final   • EOSIN 05/06/2019 5  0.0000 - 6.0000 % Final   • BASO 05/06/2019 1  0.0000 - 2.0000 % Final   • Percent Immature Granuloctyes 05/06/2019 0  % Final   • Absolute Neutrophil 05/06/2019 4.4  1.8 - 7.7 K/mcL Final   • Absolute Lymph 05/06/2019 1.4  1.0 - 4.0 K/mcL Final   •  Absolute Mono 05/06/2019 0.7  0.3 - 0.9 K/mcL Final   • Absolute Eos 05/06/2019 0.4  0.1 - 0.5 K/mcL Final   • Absolute Baso 05/06/2019 0.1  0.0 - 0.3 K/mcL Final   • Absolute Immature Granulocytes 05/06/2019 0.0  0 - 0.2 K/mcl Final   Prior Original Records on 05/06/2019   Component Date Value Ref Range Status   • CA27.29 05/06/2019 14.3  0.0 - 40.0 Units/mL Final   Prior Original Records on 05/06/2019   Component Date Value Ref Range Status   • CEA 05/06/2019 1.4  0.0 - 5.0 ng/mL Final   Prior Original Records on 05/06/2019   Component Date Value Ref Range Status   • Sodium 05/06/2019 142  135 - 145 mmol/L Final   • Creatinine Clearance (Est) 05/06/2019 57.0800  mL/min Final   • BUN/Creatinine Ratio 05/06/2019 22  7 - 25 Final   • GFR Estimate,  05/06/2019 39  mL/min Final   • GFR Estimate, Non  05/06/2019 34  mL/min Final   • A/G Ratio, Serum 05/06/2019 1.2  1.0 - 2.4 Final   • Potassium 05/06/2019 4.8  3.4 - 5.1 mmol/L Final   • Chloride 05/06/2019 111* 98 - 107 mmol/L Final   • Carbon Dioxide 05/06/2019 24  21 - 32 mmol/L Final   • Anion Gap 05/06/2019 12  10 - 20 mmol/L Final   • Glucose 05/06/2019 98  65 - 99 mg/dL Final   • BUN 05/06/2019 36* 6 - 20 mg/dL Final   • Creatinine 05/06/2019 1.61* 0.51 - 0.95 mg/dL Final   • CALCIUM 05/06/2019 9.2  8.4 - 10.2 mg/dL Final   • TOTAL BILIRUBIN 05/06/2019 0.5  0.2 - 1.0 mg/dL Final   • AST/SGOT 05/06/2019 23  <38 Units/L Final   • ALT/SGPT 05/06/2019 21  <64 Units/L Final   • ALK PHOSPHATASE 05/06/2019 94  45 - 117 Units/L Final   • TOTAL PROTEIN 05/06/2019 6.5  6.4 - 8.2 g/dL Final   • Albumin 05/06/2019 3.6  3.6 - 5.1 g/dL Final   • GLOBULIN 05/06/2019 2.9  2.0 - 4.0 g/dL Final   Prior Original Records on 11/06/2018   Component Date Value Ref Range Status   • CEA 11/06/2018 0.5  0.0 - 5.0 ng/mL Final   Prior Original Records on 11/06/2018   Component Date Value Ref Range Status   • CA27.29 11/06/2018 15.4  0.0 - 40.0 Units/mL Final    There may be more visits with results that are not included.       EKG   Results for orders placed or performed in visit on 04/22/19   ELECTROCARDIOGRAM 12-LEAD    Impression    Sinus bradycardia, QRS - 100ms     EKG - NSR, QRS interval 102ms    Assessment/Plan  Problem List Items Addressed This Visit        Circulatory    Paroxysmal atrial fibrillation (CMS/HCC)    Relevant Orders    ELECTROCARDIOGRAM 12-LEAD    Benign essential hypertension - Primary    Relevant Orders    ELECTROCARDIOGRAM 12-LEAD    Sinus bradycardia    Relevant Orders    ELECTROCARDIOGRAM 12-LEAD       Musculoskeletal    Bilateral leg edema    Relevant Orders    ELECTROCARDIOGRAM 12-LEAD       Endocrine    Hyperlipidemia    Relevant Orders    ELECTROCARDIOGRAM 12-LEAD       is pleasant 63-year-old female with past medical history of hypertension, hyperlipidemia, right breast cancer status post lumpectomy and radiation in 2015, tongue cancer status post resection in 1990, chronic kidney disease with creatinine around 1.7 who presented for follow-up      Paroxysmal atrial fibrillation - now in NSR  [id number] score is at least 2, which correlates with 2.2% risk for stroke per year without anticoagulation.   Continue Eliquis 5 mg p.o. twice daily  Continue metoprolol succinate 50 mg p.o. daily  Continue Flecainide 50mg po bid, QRS is 100ms at the last visit     Hypertension -well controlled on current regimen  Change amlodipine to 5 mg po bid instead of 10 mg po daily to minimize LE edema    Hyperlipidemia - atorvastatin 20 mg p.o. daily      Shortness of breath - 2/2 a-fib with rvr - resolved  BNP - 53  Nuclear medicine stress test on 09/12/18 - negative for ischemia     LE edema b/l - non-pitting 2/2 amlodipine -on and off  Continue to monitor  Encourage low sodium diet !     Sinus bradycardia - medications induced, asymptomatic , occasional   Continue current dose of medications unless HR persistently < 50    CKD - creatinine is  stable    Return in about 6 months (around 4/12/2021).                     Karina Cordoba D.O.

## 2022-05-18 ASSESSMENT — ENCOUNTER SYMPTOMS
NERVOUS/ANXIOUS: 1
BREAST MASS: 0

## 2022-05-19 ENCOUNTER — OFFICE VISIT (OUTPATIENT)
Dept: FAMILY MEDICINE | Facility: CLINIC | Age: 37
End: 2022-05-19
Payer: COMMERCIAL

## 2022-05-19 VITALS
SYSTOLIC BLOOD PRESSURE: 122 MMHG | DIASTOLIC BLOOD PRESSURE: 80 MMHG | WEIGHT: 148 LBS | HEART RATE: 83 BPM | TEMPERATURE: 98.8 F | BODY MASS INDEX: 25.27 KG/M2 | OXYGEN SATURATION: 100 % | HEIGHT: 64 IN

## 2022-05-19 DIAGNOSIS — Z12.4 SCREENING FOR CERVICAL CANCER: ICD-10-CM

## 2022-05-19 DIAGNOSIS — Z30.09 GENERAL COUNSELING FOR PRESCRIPTION OF ORAL CONTRACEPTIVES: ICD-10-CM

## 2022-05-19 DIAGNOSIS — Z98.890 HISTORY OF LOOP ELECTRICAL EXCISION PROCEDURE (LEEP): ICD-10-CM

## 2022-05-19 DIAGNOSIS — Z13.220 SCREENING FOR CHOLESTEROL LEVEL: ICD-10-CM

## 2022-05-19 DIAGNOSIS — Z11.59 ENCOUNTER FOR HEPATITIS C SCREENING TEST FOR LOW RISK PATIENT: ICD-10-CM

## 2022-05-19 DIAGNOSIS — Z00.00 ROUTINE GENERAL MEDICAL EXAMINATION AT A HEALTH CARE FACILITY: Primary | ICD-10-CM

## 2022-05-19 LAB
CHOLEST SERPL-MCNC: 191 MG/DL
FASTING STATUS PATIENT QL REPORTED: NO
HCV AB SERPL QL IA: NONREACTIVE
HDLC SERPL-MCNC: 91 MG/DL
LDLC SERPL CALC-MCNC: 79 MG/DL
NONHDLC SERPL-MCNC: 100 MG/DL
TRIGL SERPL-MCNC: 104 MG/DL

## 2022-05-19 PROCEDURE — 99395 PREV VISIT EST AGE 18-39: CPT | Performed by: FAMILY MEDICINE

## 2022-05-19 PROCEDURE — 87624 HPV HI-RISK TYP POOLED RSLT: CPT | Performed by: FAMILY MEDICINE

## 2022-05-19 PROCEDURE — 36415 COLL VENOUS BLD VENIPUNCTURE: CPT | Performed by: FAMILY MEDICINE

## 2022-05-19 PROCEDURE — 80061 LIPID PANEL: CPT | Performed by: FAMILY MEDICINE

## 2022-05-19 PROCEDURE — 88175 CYTOPATH C/V AUTO FLUID REDO: CPT | Performed by: FAMILY MEDICINE

## 2022-05-19 PROCEDURE — 86803 HEPATITIS C AB TEST: CPT | Performed by: FAMILY MEDICINE

## 2022-05-19 RX ORDER — DESOGESTREL AND ETHINYL ESTRADIOL 0.15-0.03
KIT ORAL
Qty: 84 TABLET | Refills: 3 | Status: SHIPPED | OUTPATIENT
Start: 2022-05-19 | End: 2023-09-12

## 2022-05-19 ASSESSMENT — ANXIETY QUESTIONNAIRES
GAD7 TOTAL SCORE: 3
2. NOT BEING ABLE TO STOP OR CONTROL WORRYING: NOT AT ALL
7. FEELING AFRAID AS IF SOMETHING AWFUL MIGHT HAPPEN: NOT AT ALL
GAD7 TOTAL SCORE: 3
3. WORRYING TOO MUCH ABOUT DIFFERENT THINGS: SEVERAL DAYS
1. FEELING NERVOUS, ANXIOUS, OR ON EDGE: SEVERAL DAYS
6. BECOMING EASILY ANNOYED OR IRRITABLE: SEVERAL DAYS
5. BEING SO RESTLESS THAT IT IS HARD TO SIT STILL: NOT AT ALL

## 2022-05-19 ASSESSMENT — ENCOUNTER SYMPTOMS
BREAST MASS: 0
NERVOUS/ANXIOUS: 1

## 2022-05-19 ASSESSMENT — PATIENT HEALTH QUESTIONNAIRE - PHQ9
5. POOR APPETITE OR OVEREATING: NOT AT ALL
SUM OF ALL RESPONSES TO PHQ QUESTIONS 1-9: 2
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
## 2022-05-23 NOTE — RESULT ENCOUNTER NOTE
Lee Ann,  Your lab results were normal/stable. Please feel free to my chart or call the office with questions. Delfina Seaman M.D.

## 2022-05-24 LAB
BKR LAB AP GYN ADEQUACY: NORMAL
BKR LAB AP GYN INTERPRETATION: NORMAL
BKR LAB AP HPV REFLEX: NORMAL
BKR LAB AP PREVIOUS ABNL DX: NORMAL
BKR LAB AP PREVIOUS ABNORMAL: NORMAL
PATH REPORT.COMMENTS IMP SPEC: NORMAL
PATH REPORT.COMMENTS IMP SPEC: NORMAL
PATH REPORT.RELEVANT HX SPEC: NORMAL

## 2022-05-26 ENCOUNTER — PATIENT OUTREACH (OUTPATIENT)
Dept: FAMILY MEDICINE | Facility: CLINIC | Age: 37
End: 2022-05-26
Payer: COMMERCIAL

## 2022-05-26 LAB
HUMAN PAPILLOMA VIRUS 16 DNA: NEGATIVE
HUMAN PAPILLOMA VIRUS 18 DNA: NEGATIVE
HUMAN PAPILLOMA VIRUS FINAL DIAGNOSIS: NORMAL
HUMAN PAPILLOMA VIRUS OTHER HR: NEGATIVE

## 2023-01-25 ENCOUNTER — TRANSFERRED RECORDS (OUTPATIENT)
Dept: HEALTH INFORMATION MANAGEMENT | Facility: CLINIC | Age: 38
End: 2023-01-25

## 2023-04-19 ENCOUNTER — OFFICE VISIT (OUTPATIENT)
Dept: FAMILY MEDICINE | Facility: CLINIC | Age: 38
End: 2023-04-19
Payer: COMMERCIAL

## 2023-04-19 VITALS
WEIGHT: 149 LBS | BODY MASS INDEX: 25.44 KG/M2 | HEART RATE: 99 BPM | HEIGHT: 64 IN | SYSTOLIC BLOOD PRESSURE: 122 MMHG | DIASTOLIC BLOOD PRESSURE: 78 MMHG | OXYGEN SATURATION: 100 % | TEMPERATURE: 99.3 F

## 2023-04-19 DIAGNOSIS — R07.89 CHEST PRESSURE: Primary | ICD-10-CM

## 2023-04-19 DIAGNOSIS — K21.00 GASTROESOPHAGEAL REFLUX DISEASE WITH ESOPHAGITIS WITHOUT HEMORRHAGE: ICD-10-CM

## 2023-04-19 DIAGNOSIS — F41.1 GENERALIZED ANXIETY DISORDER: ICD-10-CM

## 2023-04-19 PROCEDURE — 99214 OFFICE O/P EST MOD 30 MIN: CPT | Performed by: FAMILY MEDICINE

## 2023-04-19 RX ORDER — TRAZODONE HYDROCHLORIDE 150 MG/1
TABLET ORAL PRN
COMMUNITY

## 2023-04-19 RX ORDER — HYDROXYZINE PAMOATE 50 MG/1
50 CAPSULE ORAL 3 TIMES DAILY PRN
Qty: 90 CAPSULE | Refills: 3 | Status: SHIPPED | OUTPATIENT
Start: 2023-04-19

## 2023-04-19 RX ORDER — MULTIVIT,CALC,MINS/IRON/FOLIC 500-18-0.4
TABLET ORAL
COMMUNITY

## 2023-04-19 NOTE — PROGRESS NOTES
"  Assessment & Plan     Chest pressure  Unlikely to be cardiac she does have the phlegm in the morning and is clearing her throat we will treat for  - omeprazole (PRILOSEC) 20 MG DR capsule; Take 1 capsule (20 mg) by mouth 2 times daily    Gastroesophageal reflux disease with esophagitis without hemorrhage  GERD 2 times daily for 4 weeks then decrease to once daily  - omeprazole (PRILOSEC) 20 MG DR capsule; Take 1 capsule (20 mg) by mouth 2 times daily    Generalized anxiety disorder  This actually does help her the 50 mg helps calm her down.  - hydrOXYzine (VISTARIL) 50 MG capsule; Take 1 capsule (50 mg) by mouth 3 times daily as needed for anxiety or other (sleep)         BMI:   Estimated body mass index is 25.58 kg/m  as calculated from the following:    Height as of this encounter: 1.626 m (5' 4\").    Weight as of this encounter: 67.6 kg (149 lb).       Patient Instructions   Take the omeprazole 2 times per day for 2 weeks then decrease to at bedtime for 4 weeks then try going off of this . If this recurs go back on it       Delfina Seaman MD  [facility] KINZA Nance is a 37 year old, presenting for the following health issues:  Chest Pain    Urgent Care November in [place], ekg was normal.   Given hydroxyzine (25mg, used 50mg in the past) & Propranolol - didn't help.   Seen a primary in January in [place]. Chest xray & blood work- normal.   Was to follow up but had to return to [place] for her family.  Filling out release for records.   She also had pap during the PCP visit.   Daria Yates CMA    History of Present Illness       Reason for visit:  Chest pain  Symptom onset:  More than a month  Had these symptoms before:  Yes    She eats 2-3 servings of fruits and vegetables daily.She consumes 1 sweetened beverage(s) daily.She exercises with enough effort to increase her heart rate 20 to 29 minutes per day.  She exercises with enough effort to increase her heart rate 4 days per week.   She " "is taking medications regularly.       Went to urgent care as above told her it was anxiety and gave her atarax   Then follow up with primary care did bloodwork chest xray was given propranolol  She continues to have this very light pressure.  It is in the center of the chest.  She is concerned that it something cardiac although she does admit that it most likely would be related to anxiety and increased stress with work etc. there is no syncope or presyncope she has not had elevated blood pressures when she has been able to check them.  She does wake up in the morning with phlegm in the back of her throat she does notice some of this when she is lying down at night.  She has not been treated for reflux and it is very possible that this is a new esophageal spasm                Review of Systems   Constitutional, HEENT, cardiovascular, pulmonary, gi and gu systems are negative, except as otherwise noted.      Objective    /78 (BP Location: Right arm, Patient Position: Sitting, Cuff Size: Adult Regular)   Pulse 99   Temp 99.3  F (37.4  C) (Tympanic)   Ht 1.626 m (5' 4\")   Wt 67.6 kg (149 lb)   SpO2 100%   BMI 25.58 kg/m    Body mass index is 25.58 kg/m .  Physical Exam   GENERAL: healthy, alert and no distress  NECK: no adenopathy, no asymmetry, masses, or scars and thyroid normal to palpation  RESP: lungs clear to auscultation - no rales, rhonchi or wheezes  CV: regular rate and rhythm, normal S1 S2, no S3 or S4, no murmur, click or rub, no peripheral edema and peripheral pulses strong  ABDOMEN: soft, nontender, no hepatosplenomegaly, no masses and bowel sounds normal    No results found for any visits on 04/19/23.    Delfina Seaman M.D.              "

## 2023-04-19 NOTE — PATIENT INSTRUCTIONS
Take the omeprazole 2 times per day for 2 weeks then decrease to at bedtime for 4 weeks then try going off of this . If this recurs go back on it

## 2023-04-27 ENCOUNTER — PATIENT OUTREACH (OUTPATIENT)
Dept: FAMILY MEDICINE | Facility: CLINIC | Age: 38
End: 2023-04-27
Payer: COMMERCIAL

## 2023-04-27 DIAGNOSIS — N87.1 CIN II (CERVICAL INTRAEPITHELIAL NEOPLASIA II): ICD-10-CM

## 2023-04-27 NOTE — LETTER
June 1, 2023      Lee Ann Valdez  [address]   [address]  [place] [address]        Dear ,    This letter is to remind you that you are due for your follow-up Pap smear and Human Papillomavirus (HPV) test.    Please call [phone] to schedule your appointment at your earliest convenience.    If you have completed the appointment outside of the [facility] system, please have the records forwarded to our office. We will update your chart for your provider to review before your next annual wellness visit.     Thank you for choosing [facility]!      Sincerely,    Your [facility] Care Team

## 2023-06-01 NOTE — TELEPHONE ENCOUNTER
Patient due for Pap and HPV.    Reminder done today via telephone call.    VM full. Additional reminder letter sent.

## 2023-06-28 NOTE — TELEPHONE ENCOUNTER
FYI to provider - Patient is lost to pap tracking follow-up. Attempts to contact pt have been made per reminder process and there has been no reply and/or no appt scheduled. Contact hx listed below.     2011, 2012 NIL paps  11/3/20 NIL pap, colpo bx and ECC JERI I (scanned media)  1/19/21 LEEP focal JERI II, margins neg (scanned media)  5/13/21 NIL pap, neg HPV  5/19/22 NIL pap, neg HPV. Plan: cotest in 1 year  4/27/23 Reminder mychart  5/30/23 Reminder call -- VM full  6/1/23 Reminder call -- VM full. Additional reminder letter sent.  6/28/23 Lost to follow-up for pap tracking       Avani Deleon RN BSN, Pap Tracking

## 2023-09-09 DIAGNOSIS — Z30.09 GENERAL COUNSELING FOR PRESCRIPTION OF ORAL CONTRACEPTIVES: ICD-10-CM

## 2023-09-11 NOTE — TELEPHONE ENCOUNTER
"Routing refill request to provider for review/approval because:  Due for an annual        Requested Prescriptions   Pending Prescriptions Disp Refills    ISIBLOOM 0.15-30 MG-MCG tablet [Pharmacy Med Name: ISIBLOOM 0.15MG-0.03MG TABLETS 28S] 84 tablet 3     Sig: TAKE 1 TABLET BY MOUTH EVERY DAY FOR 12 WEEKS, SKIP PLACEBOS AND TAKE PLACEBOS FOR WEEK 13       Contraceptives Protocol Passed - 9/9/2023  9:44 PM        Passed - Patient is not a current smoker if age is 35 or older        Passed - Recent (12 mo) or future (30 days) visit within the authorizing provider's specialty     Patient has had an office visit with the authorizing provider or a provider within the authorizing providers department within the previous 12 mos or has a future within next 30 days. See \"Patient Info\" tab in inbasket, or \"Choose Columns\" in Meds & Orders section of the refill encounter.              Passed - Medication is active on med list        Passed - No active pregnancy on record        Passed - No positive pregnancy test in past 12 months                 Kristine Baum RN 09/11/23 12:45 PM    "

## 2023-09-12 RX ORDER — DESOGESTREL AND ETHINYL ESTRADIOL 0.15-0.03
KIT ORAL
Qty: 112 TABLET | Refills: 3 | Status: SHIPPED | OUTPATIENT
Start: 2023-09-12 | End: 2024-08-15

## 2023-10-10 ENCOUNTER — OFFICE VISIT (OUTPATIENT)
Dept: FAMILY MEDICINE | Facility: CLINIC | Age: 38
End: 2023-10-10
Payer: COMMERCIAL

## 2023-10-10 VITALS
WEIGHT: 153 LBS | SYSTOLIC BLOOD PRESSURE: 122 MMHG | HEIGHT: 64 IN | OXYGEN SATURATION: 100 % | BODY MASS INDEX: 26.12 KG/M2 | DIASTOLIC BLOOD PRESSURE: 80 MMHG | HEART RATE: 106 BPM | TEMPERATURE: 99.6 F

## 2023-10-10 DIAGNOSIS — Z98.890 HISTORY OF LOOP ELECTRICAL EXCISION PROCEDURE (LEEP): ICD-10-CM

## 2023-10-10 DIAGNOSIS — N93.0 PCB (POST COITAL BLEEDING): Primary | ICD-10-CM

## 2023-10-10 PROCEDURE — 99213 OFFICE O/P EST LOW 20 MIN: CPT | Performed by: FAMILY MEDICINE

## 2023-10-10 NOTE — PROGRESS NOTES
"  Assessment & Plan     PCB (post coital bleeding)  Will refer for further evaluation   - Ob/Gyn Referral; Future    History of loop electrical excision procedure (LEEP)  Last 3 paps normal        BMI:   Estimated body mass index is 26.26 kg/m  as calculated from the following:    Height as of this encounter: 1.626 m (5' 4\").    Weight as of this encounter: 69.4 kg (153 lb).       CONSULTATION/REFERRAL to gyne    Delfina Seaman MD  [facility] KINZA Nance is a 38 year old, presenting for the following health issues:  Vaginal Bleeding        10/10/2023     9:54 AM   Additional Questions   Roomed by Daria MEYERS CMA       History of Present Illness       Reason for visit:  Bleeding after sex and night sweats  Symptom onset:  More than a month  Symptom intensity:  Moderate  Symptom progression:  Staying the same    She eats 2-3 servings of fruits and vegetables daily.She consumes 0 sweetened beverage(s) daily.She exercises with enough effort to increase her heart rate 30 to 60 minutes per day.  She exercises with enough effort to increase her heart rate 4 days per week.   She is taking medications regularly.     She had some minimal spotting after sex 2 months ago and then the last 2 weeks she has had more bleeding and it has been more   Gets period every 10 weeks this has been regular   She has the room very warm at night and sometimes sweats and has to change her jammies           Review of Systems   Constitutional, HEENT, cardiovascular, pulmonary, gi and gu systems are negative, except as otherwise noted.      Objective    /80 (BP Location: Right arm, Patient Position: Sitting, Cuff Size: Adult Regular)   Pulse 106   Temp 99.6  F (37.6  C) (Tympanic)   Ht 1.626 m (5' 4\")   Wt 69.4 kg (153 lb)   SpO2 100%   BMI 26.26 kg/m    Body mass index is 26.26 kg/m .  Physical Exam   GENERAL: healthy, alert and no distress  ABDOMEN: soft, nontender, no hepatosplenomegaly, no masses " and bowel sounds normal   (female): normal female external genitalia, normal urethral meatus , vaginal mucosa pink, moist, well rugated, and cervical erosion ? Polyp in the area of the leep procedure is an area that is friable     Office Visit on 05/19/2022   Component Date Value Ref Range Status    Interpretation 05/19/2022 Negative for Intraepithelial Lesion or Malignancy (NILM)    Final    Comment 05/19/2022    Final                    Value:This result contains rich text formatting which cannot be displayed here.    Specimen Adequacy 05/19/2022 Satisfactory for evaluation, endocervical/transformation zone component absent   Final    Clinical Information 05/19/2022    Final                    Value:This result contains rich text formatting which cannot be displayed here.    Reflex Testing 05/19/2022 Yes regardless of result   Final    Previous Abnormal? 05/19/2022    Final                    Value:This result contains rich text formatting which cannot be displayed here.    Previous Abnormal Diagnosis 05/19/2022    Final                    Value:This result contains rich text formatting which cannot be displayed here.    Performing Labs 05/19/2022    Final                    Value:This result contains rich text formatting which cannot be displayed here.    Cholesterol 05/19/2022 191  <200 mg/dL Final    Triglycerides 05/19/2022 104  <150 mg/dL Final    Direct Measure HDL 05/19/2022 91  >=50 mg/dL Final    LDL Cholesterol Calculated 05/19/2022 79  <=100 mg/dL Final    Non HDL Cholesterol 05/19/2022 100  <130 mg/dL Final    Patient Fasting > 8hrs? 05/19/2022 No   Final    Hepatitis C Antibody 05/19/2022 Nonreactive  Nonreactive Final    Other HR HPV 05/19/2022 Negative  Negative Final    HPV16 DNA 05/19/2022 Negative  Negative Final    HPV18 DNA 05/19/2022 Negative  Negative Final    FINAL DIAGNOSIS 05/19/2022    Final                    Value:This result contains rich text formatting which cannot be displayed  here.       Delfina Seaman M.D.

## 2023-11-01 ENCOUNTER — LAB REQUISITION (OUTPATIENT)
Dept: LAB | Facility: CLINIC | Age: 38
End: 2023-11-01
Payer: COMMERCIAL

## 2023-11-01 ENCOUNTER — TRANSFERRED RECORDS (OUTPATIENT)
Dept: HEALTH INFORMATION MANAGEMENT | Facility: CLINIC | Age: 38
End: 2023-11-01

## 2023-11-01 DIAGNOSIS — Z11.3 ENCOUNTER FOR SCREENING FOR INFECTIONS WITH A PREDOMINANTLY SEXUAL MODE OF TRANSMISSION: ICD-10-CM

## 2023-11-01 DIAGNOSIS — N93.0 POSTCOITAL AND CONTACT BLEEDING: ICD-10-CM

## 2023-11-01 PROCEDURE — 88305 TISSUE EXAM BY PATHOLOGIST: CPT | Mod: TC,ORL | Performed by: OBSTETRICS & GYNECOLOGY

## 2023-11-01 PROCEDURE — 87491 CHLMYD TRACH DNA AMP PROBE: CPT | Mod: ORL | Performed by: OBSTETRICS & GYNECOLOGY

## 2023-11-02 LAB
C TRACH DNA SPEC QL PROBE+SIG AMP: NEGATIVE
N GONORRHOEA DNA SPEC QL NAA+PROBE: NEGATIVE

## 2023-11-03 LAB
PATH REPORT.COMMENTS IMP SPEC: NORMAL
PATH REPORT.COMMENTS IMP SPEC: NORMAL
PATH REPORT.FINAL DX SPEC: NORMAL
PATH REPORT.GROSS SPEC: NORMAL
PATH REPORT.MICROSCOPIC SPEC OTHER STN: NORMAL
PATH REPORT.RELEVANT HX SPEC: NORMAL
PHOTO IMAGE: NORMAL

## 2023-11-03 PROCEDURE — 88305 TISSUE EXAM BY PATHOLOGIST: CPT | Mod: 26 | Performed by: PATHOLOGY

## 2024-05-29 DIAGNOSIS — K21.00 GASTROESOPHAGEAL REFLUX DISEASE WITH ESOPHAGITIS WITHOUT HEMORRHAGE: ICD-10-CM

## 2024-05-29 DIAGNOSIS — R07.89 CHEST PRESSURE: ICD-10-CM

## 2024-05-29 NOTE — TELEPHONE ENCOUNTER
Routing refill request to provider for review/approval because:  Patient needs to be seen because:  greater than 1 year since last annual physical          Requested Prescriptions   Pending Prescriptions Disp Refills    omeprazole (PRILOSEC) 20 MG DR capsule [Pharmacy Med Name: OMEPRAZOLE 20MG CAPSULES] 60 capsule 3     Sig: TAKE 1 CAPSULE(20 MG) BY MOUTH TWICE DAILY       PPI Protocol Passed - 5/29/2024 12:43 PM        Passed - Medication is active on med list        Passed - Medication indicated for associated diagnosis     The medication is prescribed for one or more of the following conditions:     Erosive esophagitis    Gastritis   Gastric hypersecretion   Gastric ulcer   Gastroesophageal reflux disease   Helicobacter pylori gastrointestinal tract infection   Ulcer of duodenum   Drug-induced peptic ulcer   Esophageal stricture   Gastrointestinal hemorrhage   Indigestion   Stress ulcer   Zollinger-Blanco syndrome   Collins s esophagus   Laryngopharyngeal reflux          Passed - Recent (12 mo) or future (90 days) visit within the authorizing provider's specialty     The patient must have completed an in-person or virtual visit within the past 12 months or has a future visit scheduled within the next 90 days with the authorizing provider s specialty.  Urgent care and e-visits do not quality as an office visit for this protocol.          Passed - Patient is age 18 or older        Passed - No active pregnacy on record        Passed - No positive pregnancy test in past 12 months                 Saravanan Kim RN 05/29/24 3:24 PM

## 2024-08-14 DIAGNOSIS — Z30.09 GENERAL COUNSELING FOR PRESCRIPTION OF ORAL CONTRACEPTIVES: ICD-10-CM

## 2024-08-15 RX ORDER — DESOGESTREL AND ETHINYL ESTRADIOL 0.15-0.03
KIT ORAL
Qty: 112 TABLET | Refills: 3 | Status: SHIPPED | OUTPATIENT
Start: 2024-08-15

## 2024-09-16 ENCOUNTER — OFFICE VISIT (OUTPATIENT)
Dept: MIDWIFE SERVICES | Facility: CLINIC | Age: 39
End: 2024-09-16
Payer: COMMERCIAL

## 2024-09-16 VITALS
BODY MASS INDEX: 28.09 KG/M2 | HEIGHT: 65 IN | OXYGEN SATURATION: 100 % | HEART RATE: 90 BPM | WEIGHT: 168.6 LBS | DIASTOLIC BLOOD PRESSURE: 67 MMHG | SYSTOLIC BLOOD PRESSURE: 118 MMHG

## 2024-09-16 DIAGNOSIS — Z13.29 SCREENING FOR THYROID DISORDER: ICD-10-CM

## 2024-09-16 DIAGNOSIS — Z12.4 SCREENING FOR CERVICAL CANCER: ICD-10-CM

## 2024-09-16 DIAGNOSIS — Z01.419 WELL WOMAN EXAM WITH ROUTINE GYNECOLOGICAL EXAM: Primary | ICD-10-CM

## 2024-09-16 DIAGNOSIS — Z13.220 SCREENING FOR LIPID DISORDERS: ICD-10-CM

## 2024-09-16 DIAGNOSIS — Z13.1 SCREENING FOR DIABETES MELLITUS: ICD-10-CM

## 2024-09-16 PROCEDURE — 99386 PREV VISIT NEW AGE 40-64: CPT | Performed by: ADVANCED PRACTICE MIDWIFE

## 2024-09-16 PROCEDURE — 87624 HPV HI-RISK TYP POOLED RSLT: CPT | Performed by: ADVANCED PRACTICE MIDWIFE

## 2024-09-16 PROCEDURE — G0145 SCR C/V CYTO,THINLAYER,RESCR: HCPCS | Performed by: ADVANCED PRACTICE MIDWIFE

## 2024-09-16 PROCEDURE — 99459 PELVIC EXAMINATION: CPT | Performed by: ADVANCED PRACTICE MIDWIFE

## 2024-09-16 ASSESSMENT — ANXIETY QUESTIONNAIRES
5. BEING SO RESTLESS THAT IT IS HARD TO SIT STILL: NOT AT ALL
2. NOT BEING ABLE TO STOP OR CONTROL WORRYING: NOT AT ALL
1. FEELING NERVOUS, ANXIOUS, OR ON EDGE: NOT AT ALL
7. FEELING AFRAID AS IF SOMETHING AWFUL MIGHT HAPPEN: NOT AT ALL
3. WORRYING TOO MUCH ABOUT DIFFERENT THINGS: NOT AT ALL
6. BECOMING EASILY ANNOYED OR IRRITABLE: SEVERAL DAYS
GAD7 TOTAL SCORE: 1
GAD7 TOTAL SCORE: 1

## 2024-09-16 ASSESSMENT — PATIENT HEALTH QUESTIONNAIRE - PHQ9
SUM OF ALL RESPONSES TO PHQ QUESTIONS 1-9: 2
5. POOR APPETITE OR OVEREATING: NOT AT ALL

## 2024-09-16 NOTE — PROGRESS NOTES
Lee Ann is a 39 year old  female who presents for annual exam.     Menses are regular q 90 days and normal lasting 6 days.  Menses flow: normal.  No LMP recorded.. Using oral contraceptives for contraception.  She is not currently considering pregnancy.  Besides routine health maintenance,  she would like to discuss refills. She reports her bleeding with IC has resolved.   GYNECOLOGIC HISTORY:  Menarche:   Lee Ann is sexually active with 1male partner(s) and is currently in monogamous relationship.    History sexually transmitted infections:HPV  STI testing offered?  Declined  GARRET exposure: Unknown  History of abnormal Pap smear: YES - reflected in Problem List and Health Maintenance accordingly  Family history of breast CA: No  Family history of uterine/ovarian CA: No    Family history of colon CA: No    HEALTH MAINTENANCE:  Cholesterol: (  Cholesterol   Date Value Ref Range Status   2022 191 <200 mg/dL Final    History of abnormal lipids: No  Mammo: na . History of abnormal Mammo: Not applicable.  Regular Self Breast Exams: No  Calcium/Vitamin D intake: source:  dietary supplement(s) Adequate? Yes  TSH: (  TSH   Date Value Ref Range Status   2011 3.31 0.4 - 5.0 mU/L Final    )  Pap; (  Lab Results   Component Value Date    PAP NIL 2021    PAP NIL 2012    PAP NIL 2011    )    HISTORY:  OB History    Para Term  AB Living   0 0 0 0 0 0   SAB IAB Ectopic Multiple Live Births   0 0 0 0 0     Past Medical History:   Diagnosis Date    Depressive disorder     NO ACTIVE PROBLEMS      Past Surgical History:   Procedure Laterality Date    BIOPSY      November had bump removed from thigh    LEEP TX, CERVICAL  2021    JERI II    NO HISTORY OF SURGERY       Family History   Problem Relation Age of Onset    Hypertension Mother     Other Cancer Mother         Multiple myeloma    Osteoporosis Mother     Depression Maternal Uncle     Diabetes Maternal Uncle     Diabetes  Other         Uncle    Depression Other     Mental Illness Other     Asthma No family hx of     C.A.D. No family hx of     Cerebrovascular Disease No family hx of     Breast Cancer No family hx of     Cancer - colorectal No family hx of     Prostate Cancer No family hx of      Social History     Socioeconomic History    Marital status: Single     Spouse name: Augie Valdez    Number of children: 0    Years of education: 16+    Highest education level: None   Occupational History    Occupation: IT     Employer: [employer]   Tobacco Use    Smoking status: Never    Smokeless tobacco: Never   Substance and Sexual Activity    Alcohol use: Yes     Comment: Couple per week.     Drug use: No    Sexual activity: Yes     Partners: Male     Birth control/protection: Pill   Other Topics Concern     Service No    Blood Transfusions No    Caffeine Concern No    Occupational Exposure No    Hobby Hazards No    Sleep Concern No    Stress Concern No    Weight Concern No    Special Diet No    Back Care No    Exercise No    Bike Helmet Yes    Seat Belt Yes    Self-Exams Yes    Parent/sibling w/ CABG, MI or angioplasty before 65F 55M? No   Social History Narrative    Lives with 2 roommates     Social Determinants of Health     Financial Resource Strain: Unknown (10/10/2023)    Financial Resource Strain     Within the past 12 months, have you or your family members you live with been unable to get utilities (heat, electricity) when it was really needed?: Patient refused   Food Insecurity: Unknown (10/10/2023)    Food Insecurity     Within the past 12 months, did you worry that your food would run out before you got money to buy more?: Patient refused     Within the past 12 months, did the food you bought just not last and you didn t have money to get more?: Patient refused   Transportation Needs: Unknown (10/10/2023)    Transportation Needs     Within the past 12 months, has lack of transportation kept you from medical  "appointments, getting your medicines, non-medical meetings or appointments, work, or from getting things that you need?: Patient refused   Housing Stability: Unknown (10/10/2023)    Housing Stability     Do you have housing? : Patient refused     Are you worried about losing your housing?: Patient refused       Current Outpatient Medications:     hydrOXYzine (VISTARIL) 50 MG capsule, Take 1 capsule (50 mg) by mouth 3 times daily as needed for anxiety or other (sleep), Disp: 90 capsule, Rfl: 3    ISIBLOOM 0.15-30 MG-MCG tablet, TAKE 1 TABLET BY MOUTH EVERY DAY FOR 12 WEEKS, SKIP PLACEBOS AND TAKE PLACEBOS FOR WEEK 13, Disp: 112 tablet, Rfl: 3    Multiple Vitamin (MULTIVITAMIN PO), , Disp: , Rfl:     Multiple Vitamins-Calcium (ONE-A-DAY WOMENS FORMULA) TABS, , Disp: , Rfl:     omeprazole (PRILOSEC) 20 MG DR capsule, TAKE 1 CAPSULE(20 MG) BY MOUTH TWICE DAILY, Disp: 60 capsule, Rfl: 0    traZODone (DESYREL) 150 MG tablet, as needed Takes 1/2 tab as needed., Disp: , Rfl:      Allergies   Allergen Reactions    Amoxicillin-Pot Clavulanate Hives       Past medical, surgical, social and family history were reviewed and updated in EPIC.    ROS:   C:     NEGATIVE for fever, chills, change in weight  I:       NEGATIVE for worrisome rashes, moles or lesions  E:     NEGATIVE for vision changes or irritation  E/M: NEGATIVE for ear, mouth and throat problems  R:     NEGATIVE for significant cough or SOB  CV:   NEGATIVE for chest pain, palpitations or peripheral edema  GI:     NEGATIVE for nausea, abdominal pain, heartburn, or change in bowel habits  :   NEGATIVE for frequency, dysuria, hematuria, vaginal discharge, or irregular bleeding  M:     NEGATIVE for significant arthralgias or myalgia  N:      NEGATIVE for weakness, dizziness or paresthesias  E:      NEGATIVE for temperature intolerance, skin/hair changes  P:      NEGATIVE for changes in mood or affect.    EXAM:  Ht 1.651 m (5' 5\")   Wt 76.5 kg (168 lb 9.6 oz)   BMI " 28.06 kg/m     BMI: Body mass index is 28.06 kg/m .  Constitutional: healthy, alert and no distress  Head: Normocephalic. No masses, lesions, tenderness or abnormalities  Neck: Neck supple. Trachea midline. No adenopathy. Thyroid symmetric, normal size.   Cardiovascular: RRR.   Respiratory: Negative.   Breast: No nodularity, asymmetry or nipple discharge bilaterally.  Gastrointestinal: Abdomen soft, non-tender, non-distended. No masses, organomegaly.  :  Vulva:  No external lesions, normal female hair distribution, no inguinal adenopathy.    Urethra:  Midline, non-tender, well supported, no discharge  Vagina:  Moist, pink, no abnormal discharge, no lesions  Uterus:  Normal size,  , non-tender, freely mobile  Ovaries:  No masses appreciated, non-tender, mobile  Rectal Exam: deferred  Musculoskeletal: extremities normal  Skin: no suspicious lesions or rashes  Psychiatric: Affect appropriate, cooperative,mentation appears normal.     COUNSELING:   Reviewed preventive health counseling, as reflected in patient instructions   reports that she has never smoked. She has never used smokeless tobacco.    Body mass index is 28.06 kg/m .    FRAX Risk Assessment    ASSESSMENT:  39 year old female with satisfactory annual exam  (Z01.419) Well woman exam with routine gynecological exam  (primary encounter diagnosis)  Comment:   Plan: recommend annual mammograms    (Z12.4) Screening for cervical cancer  Comment:   Plan: HPV and Gynecologic Cytology Panel -         Recommended Age 30-65 Years            (Z13.29) Screening for thyroid disorder  Comment:   Plan: TSH with free T4 reflex            (Z13.1) Screening for diabetes mellitus  Comment:   Plan: Hemoglobin A1c            (Z13.220) Screening for lipid disorders  Comment:   Plan: Lipid panel reflex to direct LDL Fasting            Return to the clinic in one year for your next annual appointment or sooner with concerns.    Kasia Hebert CNM

## 2024-09-17 LAB
HPV HR 12 DNA CVX QL NAA+PROBE: NEGATIVE
HPV16 DNA CVX QL NAA+PROBE: NEGATIVE
HPV18 DNA CVX QL NAA+PROBE: NEGATIVE
HUMAN PAPILLOMA VIRUS FINAL DIAGNOSIS: NORMAL

## 2024-09-19 LAB
BKR AP ASSOCIATED HPV REPORT: NORMAL
BKR LAB AP GYN ADEQUACY: NORMAL
BKR LAB AP GYN INTERPRETATION: NORMAL
BKR LAB AP PREVIOUS ABNL DX: NORMAL
BKR LAB AP PREVIOUS ABNORMAL: NORMAL
PATH REPORT.COMMENTS IMP SPEC: NORMAL
PATH REPORT.COMMENTS IMP SPEC: NORMAL
PATH REPORT.RELEVANT HX SPEC: NORMAL

## 2024-12-08 DIAGNOSIS — R07.89 CHEST PRESSURE: ICD-10-CM

## 2024-12-08 DIAGNOSIS — K21.00 GASTROESOPHAGEAL REFLUX DISEASE WITH ESOPHAGITIS WITHOUT HEMORRHAGE: ICD-10-CM

## 2024-12-09 ENCOUNTER — MYC MEDICAL ADVICE (OUTPATIENT)
Dept: FAMILY MEDICINE | Facility: CLINIC | Age: 39
End: 2024-12-09
Payer: COMMERCIAL

## 2024-12-09 DIAGNOSIS — K21.00 GASTROESOPHAGEAL REFLUX DISEASE WITH ESOPHAGITIS WITHOUT HEMORRHAGE: ICD-10-CM

## 2024-12-09 DIAGNOSIS — R07.89 CHEST PRESSURE: ICD-10-CM

## 2024-12-09 DIAGNOSIS — F41.1 GENERALIZED ANXIETY DISORDER: ICD-10-CM

## 2024-12-09 RX ORDER — HYDROXYZINE PAMOATE 50 MG/1
CAPSULE ORAL
Qty: 90 CAPSULE | Refills: 0 | Status: SHIPPED | OUTPATIENT
Start: 2024-12-09

## 2024-12-09 NOTE — TELEPHONE ENCOUNTER
Please call patient. They are due for an office visit /follow up and possibly labs.  Refilled [id number] you . Delfina Seaman M.D.

## 2025-03-16 ENCOUNTER — MYC REFILL (OUTPATIENT)
Dept: FAMILY MEDICINE | Facility: CLINIC | Age: 40
End: 2025-03-16
Payer: COMMERCIAL

## 2025-03-16 DIAGNOSIS — G47.09 OTHER INSOMNIA: Primary | ICD-10-CM

## 2025-03-16 DIAGNOSIS — F41.1 GENERALIZED ANXIETY DISORDER: ICD-10-CM

## 2025-03-18 RX ORDER — TRAZODONE HYDROCHLORIDE 100 MG/1
150 TABLET ORAL PRN
Qty: 30 TABLET | Refills: 0 | Status: SHIPPED | OUTPATIENT
Start: 2025-03-18

## 2025-03-18 RX ORDER — HYDROXYZINE PAMOATE 50 MG/1
50 CAPSULE ORAL 3 TIMES DAILY PRN
Qty: 60 CAPSULE | Refills: 0 | Status: SHIPPED | OUTPATIENT
Start: 2025-03-18

## 2025-07-24 DIAGNOSIS — Z30.09 GENERAL COUNSELING FOR PRESCRIPTION OF ORAL CONTRACEPTIVES: ICD-10-CM

## 2025-07-24 RX ORDER — DESOGESTREL AND ETHINYL ESTRADIOL 0.15-0.03
KIT ORAL
Qty: 112 TABLET | Refills: 0 | Status: SHIPPED | OUTPATIENT
Start: 2025-07-24

## 2025-07-24 NOTE — TELEPHONE ENCOUNTER
Please call patient. They are due for an office visit /follow up and possibly labs.  Refilled [id number] you . Delfina Seaman M.D.

## 2025-08-18 ENCOUNTER — PATIENT OUTREACH (OUTPATIENT)
Dept: CARE COORDINATION | Facility: CLINIC | Age: 40
End: 2025-08-18
Payer: COMMERCIAL